# Patient Record
Sex: FEMALE | Race: OTHER | HISPANIC OR LATINO
[De-identification: names, ages, dates, MRNs, and addresses within clinical notes are randomized per-mention and may not be internally consistent; named-entity substitution may affect disease eponyms.]

---

## 2019-05-09 ENCOUNTER — RESULT REVIEW (OUTPATIENT)
Age: 74
End: 2019-05-09

## 2020-02-14 ENCOUNTER — APPOINTMENT (OUTPATIENT)
Dept: NEUROLOGY | Facility: CLINIC | Age: 75
End: 2020-02-14
Payer: MEDICAID

## 2020-02-14 VITALS
OXYGEN SATURATION: 97 % | DIASTOLIC BLOOD PRESSURE: 76 MMHG | HEIGHT: 60 IN | HEART RATE: 81 BPM | TEMPERATURE: 98.2 F | SYSTOLIC BLOOD PRESSURE: 129 MMHG | BODY MASS INDEX: 34.95 KG/M2 | WEIGHT: 178 LBS

## 2020-02-14 DIAGNOSIS — R73.03 PREDIABETES.: ICD-10-CM

## 2020-02-14 DIAGNOSIS — Z85.3 PERSONAL HISTORY OF MALIGNANT NEOPLASM OF BREAST: ICD-10-CM

## 2020-02-14 DIAGNOSIS — Z80.42 FAMILY HISTORY OF MALIGNANT NEOPLASM OF PROSTATE: ICD-10-CM

## 2020-02-14 DIAGNOSIS — Z83.3 FAMILY HISTORY OF DIABETES MELLITUS: ICD-10-CM

## 2020-02-14 DIAGNOSIS — Z80.0 FAMILY HISTORY OF MALIGNANT NEOPLASM OF DIGESTIVE ORGANS: ICD-10-CM

## 2020-02-14 DIAGNOSIS — Z80.1 FAMILY HISTORY OF MALIGNANT NEOPLASM OF TRACHEA, BRONCHUS AND LUNG: ICD-10-CM

## 2020-02-14 DIAGNOSIS — E05.90 THYROTOXICOSIS, UNSPECIFIED W/OUT THYROTOXIC CRISIS OR STORM: ICD-10-CM

## 2020-02-14 PROCEDURE — 99205 OFFICE O/P NEW HI 60 MIN: CPT

## 2020-02-14 RX ORDER — METHIMAZOLE 5 MG/1
5 TABLET ORAL
Refills: 0 | Status: ACTIVE | COMMUNITY
Start: 2020-02-14

## 2020-02-14 NOTE — ASSESSMENT
[FreeTextEntry1] : The patient has balance problems and on exam she is noted to have signs of large fiber neuropathy.  She has risk factors of prior chemotherapy, pre-DM and thyroid problems.  Will check labs for basic causes of neuropathy, as well and [email or web address]. \par \par Vertigo, unclear etiology.  WIll get MRI brain to r/o cva and will refer the patient to ENT.\par \par Will discuss treatment after the results of the tests above.

## 2020-02-14 NOTE — CONSULT LETTER
[Dear  ___] : Dear  [unfilled], [Consult Letter:] : I had the pleasure of evaluating your patient, [unfilled]. [Please see my note below.] : Please see my note below. [Consult Closing:] : Thank you very much for allowing me to participate in the care of this patient.  If you have any questions, please do not hesitate to contact me. [Sincerely,] : Sincerely, [FreeTextEntry3] : Liv Larios MD, MPH\par

## 2020-02-14 NOTE — PHYSICAL EXAM
[General Appearance - Alert] : alert [General Appearance - In No Acute Distress] : in no acute distress [Person] : oriented to person [Place] : oriented to place [Time] : oriented to time [Registration Intact] : recent registration memory intact [Concentration Intact] : normal concentrating ability [Visual Intact] : visual attention was ~T not ~L decreased [Naming Objects] : no difficulty naming common objects [Repeating Phrases] : no difficulty repeating a phrase [Fluency] : fluency intact [Comprehension] : comprehension intact [Vocabulary] : adequate range of vocabulary [Cranial Nerves Optic (II)] : visual acuity intact bilaterally,  visual fields full to confrontation, pupils equal round and reactive to light [Cranial Nerves Oculomotor (III)] : extraocular motion intact [Cranial Nerves Trigeminal (V)] : facial sensation intact symmetrically [Cranial Nerves Facial (VII)] : face symmetrical [Cranial Nerves Vestibulocochlear (VIII)] : hearing was intact bilaterally [Cranial Nerves Glossopharyngeal (IX)] : tongue and palate midline [Cranial Nerves Accessory (XI - Cranial And Spinal)] : head turning and shoulder shrug symmetric [Cranial Nerves Hypoglossal (XII)] : there was no tongue deviation with protrusion [Motor Tone] : muscle tone was normal in all four extremities [Motor Strength] : muscle strength was normal in all four extremities [Involuntary Movements] : no involuntary movements were seen [Sensation Tactile Decrease] : light touch was intact [Sensation Pain / Temperature Decrease] : pain and temperature was intact [Romberg's Sign] : a positive Romberg's sign was present [Abnormal Walk] : normal gait [Limited Balance] : the patient's balance was impaired [1+] : Ankle jerk left 1+ [Full Pulse] : the pedal pulses are present [Coordination - Dysmetria Impaired Finger-to-Nose Bilateral] : not present [Coordination - Dysmetria Impaired Heel-to-Shin Bilateral] : not present [Plantar Reflex Right Only] : normal on the right [Plantar Reflex Left Only] : normal on the left [FreeTextEntry5] : fundi not visualized [FreeTextEntry7] : vibration and proprioception decreased in the toes

## 2020-02-19 LAB
ALBUMIN MFR SERPL ELPH: 56 %
ALBUMIN SERPL-MCNC: 4.1 G/DL
ALBUMIN/GLOB SERPL: 1.2 RATIO
ALPHA1 GLOB MFR SERPL ELPH: 4.3 %
ALPHA1 GLOB SERPL ELPH-MCNC: 0.3 G/DL
ALPHA2 GLOB MFR SERPL ELPH: 10 %
ALPHA2 GLOB SERPL ELPH-MCNC: 0.7 G/DL
B-GLOBULIN MFR SERPL ELPH: 12.5 %
B-GLOBULIN SERPL ELPH-MCNC: 0.9 G/DL
ESTIMATED AVERAGE GLUCOSE: 143 MG/DL
FOLATE SERPL-MCNC: 16.5 NG/ML
GAMMA GLOB FLD ELPH-MCNC: 1.3 G/DL
GAMMA GLOB MFR SERPL ELPH: 17.2 %
HBA1C MFR BLD HPLC: 6.6 %
INTERPRETATION SERPL IEP-IMP: NORMAL
M PROTEIN SPEC IFE-MCNC: NORMAL
METHYLMALONATE SERPL-SCNC: 189 NMOL/L
PROT SERPL-MCNC: 7.4 G/DL
PROT SERPL-MCNC: 7.4 G/DL
T3 SERPL-MCNC: 127 NG/DL
T4 SERPL-MCNC: 6.3 UG/DL
TSH SERPL-ACNC: 0.69 UIU/ML
VIT B12 SERPL-MCNC: 775 PG/ML

## 2020-03-06 ENCOUNTER — FORM ENCOUNTER (OUTPATIENT)
Age: 75
End: 2020-03-06

## 2020-03-07 ENCOUNTER — OUTPATIENT (OUTPATIENT)
Dept: OUTPATIENT SERVICES | Facility: HOSPITAL | Age: 75
LOS: 1 days | End: 2020-03-07
Payer: COMMERCIAL

## 2020-03-07 ENCOUNTER — APPOINTMENT (OUTPATIENT)
Dept: MRI IMAGING | Facility: HOSPITAL | Age: 75
End: 2020-03-07
Payer: MEDICARE

## 2020-03-07 DIAGNOSIS — R42 DIZZINESS AND GIDDINESS: ICD-10-CM

## 2020-03-07 PROCEDURE — 70551 MRI BRAIN STEM W/O DYE: CPT | Mod: 26

## 2020-03-07 PROCEDURE — 70551 MRI BRAIN STEM W/O DYE: CPT

## 2020-05-12 ENCOUNTER — APPOINTMENT (OUTPATIENT)
Dept: NEUROLOGY | Facility: CLINIC | Age: 75
End: 2020-05-12
Payer: MEDICARE

## 2020-05-12 DIAGNOSIS — R26.89 OTHER ABNORMALITIES OF GAIT AND MOBILITY: ICD-10-CM

## 2020-05-12 PROCEDURE — 99448 NTRPROF PH1/NTRNET/EHR 21-30: CPT

## 2020-05-12 NOTE — HISTORY OF PRESENT ILLNESS
[Patient] : the patient [Home] : at home, [unfilled] , at the time of the visit. [Other Location: e.g. Home (Enter Location, City,State)___] : at [unfilled] [FreeTextEntry4] : [place] [FreeTextEntry1] : The patient continues to have balance problems, she denies numbness or tingling.  NO weakness or pain in the legs. Has not had any falls.  Does exercises at home, previously in gym, has not had balance training.  Vertigo resolved.

## 2020-05-12 NOTE — ASSESSMENT
[FreeTextEntry1] : Balance problems cw peripheral neuropathy, large fiber, may be due to prior chemotherapy, pre-DM and thyroid d/o (later now well controlled), pending emg  legs, no pain thus medications not useful but will refer to PT for balance training\par \par Vertigo, resolved

## 2020-06-16 ENCOUNTER — APPOINTMENT (OUTPATIENT)
Dept: OTOLARYNGOLOGY | Facility: CLINIC | Age: 75
End: 2020-06-16

## 2020-07-07 ENCOUNTER — APPOINTMENT (OUTPATIENT)
Dept: NEUROLOGY | Facility: CLINIC | Age: 75
End: 2020-07-07
Payer: MEDICARE

## 2020-07-07 VITALS
HEIGHT: 60 IN | HEART RATE: 83 BPM | OXYGEN SATURATION: 98 % | WEIGHT: 178 LBS | TEMPERATURE: 97.9 F | DIASTOLIC BLOOD PRESSURE: 73 MMHG | BODY MASS INDEX: 34.95 KG/M2 | SYSTOLIC BLOOD PRESSURE: 131 MMHG

## 2020-07-07 PROCEDURE — 95913 NRV CNDJ TEST 13/> STUDIES: CPT

## 2020-07-07 PROCEDURE — 95886 MUSC TEST DONE W/N TEST COMP: CPT

## 2021-04-05 ENCOUNTER — APPOINTMENT (OUTPATIENT)
Dept: COLORECTAL SURGERY | Facility: CLINIC | Age: 76
End: 2021-04-05
Payer: MEDICARE

## 2021-04-05 VITALS
SYSTOLIC BLOOD PRESSURE: 140 MMHG | WEIGHT: 180 LBS | BODY MASS INDEX: 35.34 KG/M2 | HEIGHT: 60 IN | TEMPERATURE: 98.4 F | HEART RATE: 89 BPM | DIASTOLIC BLOOD PRESSURE: 73 MMHG

## 2021-04-05 DIAGNOSIS — Z92.89 PERSONAL HISTORY OF OTHER MEDICAL TREATMENT: ICD-10-CM

## 2021-04-05 DIAGNOSIS — K63.5 POLYP OF COLON: ICD-10-CM

## 2021-04-05 DIAGNOSIS — M19.90 UNSPECIFIED OSTEOARTHRITIS, UNSPECIFIED SITE: ICD-10-CM

## 2021-04-05 DIAGNOSIS — Z82.49 FAMILY HISTORY OF ISCHEMIC HEART DISEASE AND OTHER DISEASES OF THE CIRCULATORY SYSTEM: ICD-10-CM

## 2021-04-05 DIAGNOSIS — Z72.3 LACK OF PHYSICAL EXERCISE: ICD-10-CM

## 2021-04-05 DIAGNOSIS — Z23 ENCOUNTER FOR IMMUNIZATION: ICD-10-CM

## 2021-04-05 DIAGNOSIS — E78.00 PURE HYPERCHOLESTEROLEMIA, UNSPECIFIED: ICD-10-CM

## 2021-04-05 PROCEDURE — 99205 OFFICE O/P NEW HI 60 MIN: CPT

## 2021-04-05 PROCEDURE — 99072 ADDL SUPL MATRL&STAF TM PHE: CPT

## 2021-04-05 RX ORDER — ATORVASTATIN CALCIUM 10 MG/1
10 TABLET, FILM COATED ORAL
Refills: 0 | Status: ACTIVE | COMMUNITY

## 2021-04-05 RX ORDER — METRONIDAZOLE 500 MG/1
500 TABLET ORAL
Qty: 6 | Refills: 0 | Status: ACTIVE | COMMUNITY
Start: 2021-04-05 | End: 1900-01-01

## 2021-04-05 RX ORDER — NEOMYCIN SULFATE 500 MG/1
500 TABLET ORAL
Qty: 6 | Refills: 0 | Status: ACTIVE | COMMUNITY
Start: 2021-04-05 | End: 1900-01-01

## 2021-04-05 NOTE — ASSESSMENT
[FreeTextEntry1] : I reviewed the patient treatment options at this time including right hemicolectomy as well as the need for future colonoscopy for clearance of the remaining colon polyps. I have discussed the risk of recurrent polyps and a future surgery. She has episodes of frequent fecal incontinence and therefore we will attempt to preserve as much colon as possible with limited resection and close coloscopic surveillance. However, I discussed the need for possible increasing antidiarrheal agents and worsening incontinence despite limited resection in this circumstance given her already compromised nature function.\par \par I had extensive discussion with the patient (60 minutes) regarding the diagnosis and treatment options. I recommended that he consider proceeding with a robotic possible open right hemicolectomy.\par \par The associated risks, benefits, alternatives of the procedure have been outlined discussed and reviewed with the patient's family. These risks including but not limited to bleeding, infection, anastomotic leak, need for secondary surgery, need for ileostomy or colostomy creation, change in bowel habits, cdvt,pe as well as the risk of heart and lung complications infection and death were detailed. The patient understands these risks and consents the planned procedure. Appropriate  literature regarding surgery and post operative treatment/complications and enhanced recovery pathway has been detailed and reviewed. Consent was obtained. All questions were answered.\par \par

## 2021-04-05 NOTE — PHYSICAL EXAM
[Abdomen Masses] : No abdominal masses [Abdomen Tenderness] : ~T No ~M abdominal tenderness [No HSM] : no hepatosplenomegaly [JVD] : no jugular venous distention  [Normal Breath Sounds] : Normal breath sounds [de-identified] : Well-healed infraumbilical midline incision with skin retraction and secondary laparoscopic upper abdominal incisions.

## 2021-04-05 NOTE — HISTORY OF PRESENT ILLNESS
[FreeTextEntry1] : 74 y/o English and Iraqi speaking F presents for evaluation of colon polyp, referred by Dr. Timbo Bain\par \par Patient underwent a routine screening colonoscopy 12/2021 with Dr. Bain. A number of polyps noted and recommended to repeat colonoscopy in several months\par \par Colonoscopy completed 3/23/21\par - 1 cm flat polyp in the descending colon, not biopsied or removed\par - multiple flat polyps, largest 2 cm in the transverse colon, not biopsied or removed\par - flat mass of 2.5 cm, attempted to raise this lesion with Eleview, however, it did not lift. Multiple biopsies taken\par - 1.5 cm flat polyp noted in the cecum, separate from the 2.5 cm mass, not biopsied or removed\par \par Previous colonoscopies completed prior to most recent were more than 10 years ago, patient uncertain of results\par Denies abdominal or rectal pain, rectal bleeding\par Denies N/V, change in appetite or weight\par BH: 2-4 times daily for the past several years\par Stool's consistency is apple sauce per patient\par C/o FU and occasional full FU. Reports ~ 5 episodes in the past several years, with several episodes in March 2021\par Reports adequate dietary fiber intake. Admits to limited water intake\par No use of fiber supplements \par FMH of colon cancer in brother, diagnosed ~ age 61\par No ASA/NSAIDs last 7 days\par

## 2021-04-16 ENCOUNTER — OUTPATIENT (OUTPATIENT)
Dept: OUTPATIENT SERVICES | Facility: HOSPITAL | Age: 76
LOS: 1 days | End: 2021-04-16

## 2021-04-16 ENCOUNTER — RESULT REVIEW (OUTPATIENT)
Age: 76
End: 2021-04-16

## 2021-04-16 ENCOUNTER — APPOINTMENT (OUTPATIENT)
Dept: CT IMAGING | Facility: CLINIC | Age: 76
End: 2021-04-16
Payer: MEDICARE

## 2021-04-16 PROCEDURE — 74177 CT ABD & PELVIS W/CONTRAST: CPT | Mod: 26

## 2021-04-23 ENCOUNTER — NON-APPOINTMENT (OUTPATIENT)
Age: 76
End: 2021-04-23

## 2021-04-30 DIAGNOSIS — Z01.818 ENCOUNTER FOR OTHER PREPROCEDURAL EXAMINATION: ICD-10-CM

## 2021-05-03 ENCOUNTER — APPOINTMENT (OUTPATIENT)
Dept: DISASTER EMERGENCY | Facility: CLINIC | Age: 76
End: 2021-05-03

## 2021-05-04 LAB — SARS-COV-2 N GENE NPH QL NAA+PROBE: NOT DETECTED

## 2021-05-05 ENCOUNTER — TRANSCRIPTION ENCOUNTER (OUTPATIENT)
Age: 76
End: 2021-05-05

## 2021-05-05 VITALS
DIASTOLIC BLOOD PRESSURE: 85 MMHG | TEMPERATURE: 98 F | SYSTOLIC BLOOD PRESSURE: 140 MMHG | HEART RATE: 96 BPM | RESPIRATION RATE: 18 BRPM | HEIGHT: 60 IN | WEIGHT: 177.47 LBS | OXYGEN SATURATION: 100 %

## 2021-05-06 ENCOUNTER — INPATIENT (INPATIENT)
Facility: HOSPITAL | Age: 76
LOS: 2 days | Discharge: HOME | DRG: 331 | End: 2021-05-09
Attending: SURGERY | Admitting: SURGERY
Payer: MEDICARE

## 2021-05-06 ENCOUNTER — APPOINTMENT (OUTPATIENT)
Dept: COLORECTAL SURGERY | Facility: HOSPITAL | Age: 76
End: 2021-05-06

## 2021-05-06 ENCOUNTER — RESULT REVIEW (OUTPATIENT)
Age: 76
End: 2021-05-06

## 2021-05-06 DIAGNOSIS — Z87.59 PERSONAL HISTORY OF OTHER COMPLICATIONS OF PREGNANCY, CHILDBIRTH AND THE PUERPERIUM: Chronic | ICD-10-CM

## 2021-05-06 DIAGNOSIS — Z90.49 ACQUIRED ABSENCE OF OTHER SPECIFIED PARTS OF DIGESTIVE TRACT: Chronic | ICD-10-CM

## 2021-05-06 DIAGNOSIS — Z98.51 TUBAL LIGATION STATUS: Chronic | ICD-10-CM

## 2021-05-06 DIAGNOSIS — Z98.890 OTHER SPECIFIED POSTPROCEDURAL STATES: Chronic | ICD-10-CM

## 2021-05-06 LAB
ANION GAP SERPL CALC-SCNC: 13 MMOL/L — SIGNIFICANT CHANGE UP (ref 5–17)
BLD GP AB SCN SERPL QL: NEGATIVE — SIGNIFICANT CHANGE UP
BUN SERPL-MCNC: 19 MG/DL — SIGNIFICANT CHANGE UP (ref 7–23)
CALCIUM SERPL-MCNC: 8.9 MG/DL — SIGNIFICANT CHANGE UP (ref 8.4–10.5)
CHLORIDE SERPL-SCNC: 105 MMOL/L — SIGNIFICANT CHANGE UP (ref 96–108)
CO2 SERPL-SCNC: 21 MMOL/L — LOW (ref 22–31)
CREAT SERPL-MCNC: 0.93 MG/DL — SIGNIFICANT CHANGE UP (ref 0.5–1.3)
GLUCOSE SERPL-MCNC: 161 MG/DL — HIGH (ref 70–99)
HCT VFR BLD CALC: 42.8 % — SIGNIFICANT CHANGE UP (ref 34.5–45)
HGB BLD-MCNC: 13.7 G/DL — SIGNIFICANT CHANGE UP (ref 11.5–15.5)
MAGNESIUM SERPL-MCNC: 2.1 MG/DL — SIGNIFICANT CHANGE UP (ref 1.6–2.6)
MCHC RBC-ENTMCNC: 29.8 PG — SIGNIFICANT CHANGE UP (ref 27–34)
MCHC RBC-ENTMCNC: 32 GM/DL — SIGNIFICANT CHANGE UP (ref 32–36)
MCV RBC AUTO: 93 FL — SIGNIFICANT CHANGE UP (ref 80–100)
NRBC # BLD: 0 /100 WBCS — SIGNIFICANT CHANGE UP (ref 0–0)
PHOSPHATE SERPL-MCNC: 3.1 MG/DL — SIGNIFICANT CHANGE UP (ref 2.5–4.5)
PLATELET # BLD AUTO: 195 K/UL — SIGNIFICANT CHANGE UP (ref 150–400)
POTASSIUM SERPL-MCNC: 3.7 MMOL/L — SIGNIFICANT CHANGE UP (ref 3.5–5.3)
POTASSIUM SERPL-SCNC: 3.7 MMOL/L — SIGNIFICANT CHANGE UP (ref 3.5–5.3)
RBC # BLD: 4.6 M/UL — SIGNIFICANT CHANGE UP (ref 3.8–5.2)
RBC # FLD: 14.6 % — HIGH (ref 10.3–14.5)
RH IG SCN BLD-IMP: POSITIVE — SIGNIFICANT CHANGE UP
SODIUM SERPL-SCNC: 139 MMOL/L — SIGNIFICANT CHANGE UP (ref 135–145)
WBC # BLD: 10.93 K/UL — HIGH (ref 3.8–10.5)
WBC # FLD AUTO: 10.93 K/UL — HIGH (ref 3.8–10.5)

## 2021-05-06 PROCEDURE — 88309 TISSUE EXAM BY PATHOLOGIST: CPT | Mod: 26

## 2021-05-06 PROCEDURE — 44205 LAP COLECTOMY PART W/ILEUM: CPT | Mod: GC

## 2021-05-06 RX ORDER — SODIUM CHLORIDE 9 MG/ML
1000 INJECTION, SOLUTION INTRAVENOUS
Refills: 0 | Status: DISCONTINUED | OUTPATIENT
Start: 2021-05-06 | End: 2021-05-09

## 2021-05-06 RX ORDER — DEXTROSE 50 % IN WATER 50 %
25 SYRINGE (ML) INTRAVENOUS ONCE
Refills: 0 | Status: DISCONTINUED | OUTPATIENT
Start: 2021-05-06 | End: 2021-05-09

## 2021-05-06 RX ORDER — DEXTROSE 50 % IN WATER 50 %
12.5 SYRINGE (ML) INTRAVENOUS ONCE
Refills: 0 | Status: DISCONTINUED | OUTPATIENT
Start: 2021-05-06 | End: 2021-05-09

## 2021-05-06 RX ORDER — DEXTROSE 50 % IN WATER 50 %
15 SYRINGE (ML) INTRAVENOUS ONCE
Refills: 0 | Status: DISCONTINUED | OUTPATIENT
Start: 2021-05-06 | End: 2021-05-09

## 2021-05-06 RX ORDER — HYDROMORPHONE HYDROCHLORIDE 2 MG/ML
0.5 INJECTION INTRAMUSCULAR; INTRAVENOUS; SUBCUTANEOUS EVERY 4 HOURS
Refills: 0 | Status: DISCONTINUED | OUTPATIENT
Start: 2021-05-06 | End: 2021-05-09

## 2021-05-06 RX ORDER — ACETAMINOPHEN 500 MG
1000 TABLET ORAL ONCE
Refills: 0 | Status: COMPLETED | OUTPATIENT
Start: 2021-05-06 | End: 2021-05-06

## 2021-05-06 RX ORDER — HEPARIN SODIUM 5000 [USP'U]/ML
5000 INJECTION INTRAVENOUS; SUBCUTANEOUS EVERY 8 HOURS
Refills: 0 | Status: DISCONTINUED | OUTPATIENT
Start: 2021-05-06 | End: 2021-05-09

## 2021-05-06 RX ORDER — GABAPENTIN 400 MG/1
600 CAPSULE ORAL ONCE
Refills: 0 | Status: COMPLETED | OUTPATIENT
Start: 2021-05-06 | End: 2021-05-06

## 2021-05-06 RX ORDER — BUPIVACAINE 13.3 MG/ML
20 INJECTION, SUSPENSION, LIPOSOMAL INFILTRATION ONCE
Refills: 0 | Status: DISCONTINUED | OUTPATIENT
Start: 2021-05-06 | End: 2021-05-06

## 2021-05-06 RX ORDER — METHIMAZOLE 10 MG/1
1 TABLET ORAL
Qty: 0 | Refills: 0 | DISCHARGE

## 2021-05-06 RX ORDER — ACETAMINOPHEN 500 MG
1000 TABLET ORAL EVERY 6 HOURS
Refills: 0 | Status: DISCONTINUED | OUTPATIENT
Start: 2021-05-06 | End: 2021-05-09

## 2021-05-06 RX ORDER — INSULIN LISPRO 100/ML
VIAL (ML) SUBCUTANEOUS
Refills: 0 | Status: DISCONTINUED | OUTPATIENT
Start: 2021-05-06 | End: 2021-05-09

## 2021-05-06 RX ORDER — ATORVASTATIN CALCIUM 80 MG/1
1 TABLET, FILM COATED ORAL
Qty: 0 | Refills: 0 | DISCHARGE

## 2021-05-06 RX ORDER — GLUCAGON INJECTION, SOLUTION 0.5 MG/.1ML
1 INJECTION, SOLUTION SUBCUTANEOUS ONCE
Refills: 0 | Status: DISCONTINUED | OUTPATIENT
Start: 2021-05-06 | End: 2021-05-09

## 2021-05-06 RX ORDER — KETOROLAC TROMETHAMINE 30 MG/ML
15 SYRINGE (ML) INJECTION EVERY 6 HOURS
Refills: 0 | Status: DISCONTINUED | OUTPATIENT
Start: 2021-05-06 | End: 2021-05-09

## 2021-05-06 RX ORDER — HEPARIN SODIUM 5000 [USP'U]/ML
5000 INJECTION INTRAVENOUS; SUBCUTANEOUS ONCE
Refills: 0 | Status: COMPLETED | OUTPATIENT
Start: 2021-05-06 | End: 2021-05-06

## 2021-05-06 RX ORDER — ATORVASTATIN CALCIUM 80 MG/1
10 TABLET, FILM COATED ORAL AT BEDTIME
Refills: 0 | Status: DISCONTINUED | OUTPATIENT
Start: 2021-05-06 | End: 2021-05-09

## 2021-05-06 RX ORDER — SODIUM CHLORIDE 9 MG/ML
1000 INJECTION, SOLUTION INTRAVENOUS
Refills: 0 | Status: DISCONTINUED | OUTPATIENT
Start: 2021-05-06 | End: 2021-05-08

## 2021-05-06 RX ORDER — ONDANSETRON 8 MG/1
4 TABLET, FILM COATED ORAL EVERY 6 HOURS
Refills: 0 | Status: DISCONTINUED | OUTPATIENT
Start: 2021-05-06 | End: 2021-05-09

## 2021-05-06 RX ADMIN — Medication 2: at 18:21

## 2021-05-06 RX ADMIN — HEPARIN SODIUM 5000 UNIT(S): 5000 INJECTION INTRAVENOUS; SUBCUTANEOUS at 11:42

## 2021-05-06 RX ADMIN — ATORVASTATIN CALCIUM 10 MILLIGRAM(S): 80 TABLET, FILM COATED ORAL at 21:15

## 2021-05-06 RX ADMIN — HEPARIN SODIUM 5000 UNIT(S): 5000 INJECTION INTRAVENOUS; SUBCUTANEOUS at 21:15

## 2021-05-06 RX ADMIN — Medication 1000 MILLIGRAM(S): at 18:44

## 2021-05-06 RX ADMIN — Medication 1000 MILLIGRAM(S): at 11:41

## 2021-05-06 RX ADMIN — Medication 1000 MILLIGRAM(S): at 18:35

## 2021-05-06 RX ADMIN — GABAPENTIN 600 MILLIGRAM(S): 400 CAPSULE ORAL at 11:41

## 2021-05-06 RX ADMIN — Medication 15 MILLIGRAM(S): at 18:36

## 2021-05-06 NOTE — H&P ADULT - HISTORY OF PRESENT ILLNESS
75 year old female with PMH of Graves diseazse, psoriasis, HLD, HTN, prediabetes, colon polyp presents for extended right hemicolectomy. Patient underwent screening colonoscopy on 12/20 which revealed several polyps and was referred for repeat in a couple of months. Repeat colonoscopy preformed on 3/23/21 which found multiple polyps in the transverse colon as well as a 2.5cm flat mass in the cecum. Patient has no complaints at this time.

## 2021-05-06 NOTE — H&P ADULT - NSICDXPASTSURGICALHX_GEN_ALL_CORE_FT
PAST SURGICAL HISTORY:  S/P cholecystectomy     S/P ectopic pregnancy surgery    S/P lumpectomy of breast right    S/P tubal ligation

## 2021-05-06 NOTE — PACU DISCHARGE NOTE - COMMENTS
Patient is hemodynamically stable and reports no c/o discomfort.  Meets PACU discharge criteria.  Report given to unit RN.  Patient transported via bed to unit. Accompanied by transport CNAx2

## 2021-05-06 NOTE — PROGRESS NOTE ADULT - SUBJECTIVE AND OBJECTIVE BOX
POST-OPERATIVE NOTE    Procedure: Robot assisted Right Hemicolectomy     Diagnosis/Indication: Mass in Cecum     Surgeon: Brenden Harmon     Subjective: Patient is not in acute distress, resting comfortably in bed, alert and oriented x 3, pain controlled. Patient denies fever, chills, nausea, vomiting, chest pain, calf pain or shortness of breathe      Objective:  T(C): --  T(F): --  HR: --  BP: --  RR: --  SpO2: --  Wt(kg): --                        13.7   10.93 )-----------( 195      ( 06 May 2021 17:54 )             42.8     05-06    139  |  105  |  19  ----------------------------<  161<H>  3.7   |  21<L>  |  0.93    Ca    8.9      06 May 2021 17:54  Phos  3.1     05-06  Mg     2.1     05-06        Gen: NAD, resting comfortably in bed  C/V: Regular rate  Pulm: Nonlabored breathing, no respiratory distress  Abd: Soft, NT, ND, incisions clean, dry without drainage and intact  Extrem: WWP, no calf edema or tenderness, SCDs in place      A/P: 75y Female s/p robot assisted right hemicolectomy   Pain/Nausea control   Diet: CLD  IVF: LR @ 40cc/hr  DVT ppx: SCDs in place   Encourage OOBA/IS  AM Labs

## 2021-05-06 NOTE — H&P ADULT - ASSESSMENT
75 year old female with PMH of Graves diseazse, psoriasis, HLD, HTN, prediabetes, colon polyp presents for extended right hemicolectomy.     Plan:  To OR  ERAS protocol  Colon bundle  admission post op

## 2021-05-06 NOTE — BRIEF OPERATIVE NOTE - NSICDXBRIEFPROCEDURE_GEN_ALL_CORE_FT
PROCEDURES:  Colectomy, right, robot-assisted, using da Myra Xi 06-May-2021 16:44:53  Maryellen Fuentes

## 2021-05-06 NOTE — H&P ADULT - NSICDXPASTMEDICALHX_GEN_ALL_CORE_FT
PAST MEDICAL HISTORY:  Breast cancer right    Colon polyp     Graves disease     HLD (hyperlipidemia)     HTN (hypertension)     Hyperthyroidism     Morbid obesity     Prediabetes     Psoriasis     Rosacea     Varicose veins

## 2021-05-06 NOTE — BRIEF OPERATIVE NOTE - OPERATION/FINDINGS
Robotic right colectomy with intracorporeal isoperistaltic side to side stapled anastomosis. Enterotomy closed with v-wilbur suture in two layers.

## 2021-05-07 LAB
A1C WITH ESTIMATED AVERAGE GLUCOSE RESULT: 6.3 % — HIGH (ref 4–5.6)
ANION GAP SERPL CALC-SCNC: 11 MMOL/L — SIGNIFICANT CHANGE UP (ref 5–17)
BUN SERPL-MCNC: 14 MG/DL — SIGNIFICANT CHANGE UP (ref 7–23)
CALCIUM SERPL-MCNC: 8.6 MG/DL — SIGNIFICANT CHANGE UP (ref 8.4–10.5)
CHLORIDE SERPL-SCNC: 106 MMOL/L — SIGNIFICANT CHANGE UP (ref 96–108)
CO2 SERPL-SCNC: 21 MMOL/L — LOW (ref 22–31)
COVID-19 SPIKE DOMAIN AB INTERP: POSITIVE
COVID-19 SPIKE DOMAIN ANTIBODY RESULT: >250 U/ML — HIGH
CREAT SERPL-MCNC: 0.71 MG/DL — SIGNIFICANT CHANGE UP (ref 0.5–1.3)
ESTIMATED AVERAGE GLUCOSE: 134 MG/DL — HIGH (ref 68–114)
GLUCOSE SERPL-MCNC: 115 MG/DL — HIGH (ref 70–99)
HCT VFR BLD CALC: 36.3 % — SIGNIFICANT CHANGE UP (ref 34.5–45)
HCV AB S/CO SERPL IA: 0.04 S/CO — SIGNIFICANT CHANGE UP
HCV AB SERPL-IMP: SIGNIFICANT CHANGE UP
HGB BLD-MCNC: 11.5 G/DL — SIGNIFICANT CHANGE UP (ref 11.5–15.5)
MAGNESIUM SERPL-MCNC: 1.9 MG/DL — SIGNIFICANT CHANGE UP (ref 1.6–2.6)
MCHC RBC-ENTMCNC: 29.6 PG — SIGNIFICANT CHANGE UP (ref 27–34)
MCHC RBC-ENTMCNC: 31.7 GM/DL — LOW (ref 32–36)
MCV RBC AUTO: 93.6 FL — SIGNIFICANT CHANGE UP (ref 80–100)
NRBC # BLD: 0 /100 WBCS — SIGNIFICANT CHANGE UP (ref 0–0)
PHOSPHATE SERPL-MCNC: 3.2 MG/DL — SIGNIFICANT CHANGE UP (ref 2.5–4.5)
PLATELET # BLD AUTO: 163 K/UL — SIGNIFICANT CHANGE UP (ref 150–400)
POTASSIUM SERPL-MCNC: 4.6 MMOL/L — SIGNIFICANT CHANGE UP (ref 3.5–5.3)
POTASSIUM SERPL-SCNC: 4.6 MMOL/L — SIGNIFICANT CHANGE UP (ref 3.5–5.3)
RBC # BLD: 3.88 M/UL — SIGNIFICANT CHANGE UP (ref 3.8–5.2)
RBC # FLD: 14.7 % — HIGH (ref 10.3–14.5)
SARS-COV-2 IGG+IGM SERPL QL IA: >250 U/ML — HIGH
SARS-COV-2 IGG+IGM SERPL QL IA: POSITIVE
SODIUM SERPL-SCNC: 138 MMOL/L — SIGNIFICANT CHANGE UP (ref 135–145)
WBC # BLD: 9.32 K/UL — SIGNIFICANT CHANGE UP (ref 3.8–10.5)
WBC # FLD AUTO: 9.32 K/UL — SIGNIFICANT CHANGE UP (ref 3.8–10.5)

## 2021-05-07 RX ORDER — POTASSIUM CHLORIDE 20 MEQ
40 PACKET (EA) ORAL ONCE
Refills: 0 | Status: COMPLETED | OUTPATIENT
Start: 2021-05-07 | End: 2021-05-07

## 2021-05-07 RX ADMIN — Medication 15 MILLIGRAM(S): at 12:11

## 2021-05-07 RX ADMIN — Medication 1000 MILLIGRAM(S): at 13:11

## 2021-05-07 RX ADMIN — Medication 1000 MILLIGRAM(S): at 05:33

## 2021-05-07 RX ADMIN — HEPARIN SODIUM 5000 UNIT(S): 5000 INJECTION INTRAVENOUS; SUBCUTANEOUS at 21:14

## 2021-05-07 RX ADMIN — Medication 1000 MILLIGRAM(S): at 00:05

## 2021-05-07 RX ADMIN — Medication 1000 MILLIGRAM(S): at 00:17

## 2021-05-07 RX ADMIN — Medication 15 MILLIGRAM(S): at 05:59

## 2021-05-07 RX ADMIN — HEPARIN SODIUM 5000 UNIT(S): 5000 INJECTION INTRAVENOUS; SUBCUTANEOUS at 15:06

## 2021-05-07 RX ADMIN — Medication 15 MILLIGRAM(S): at 17:46

## 2021-05-07 RX ADMIN — Medication 15 MILLIGRAM(S): at 12:26

## 2021-05-07 RX ADMIN — ATORVASTATIN CALCIUM 10 MILLIGRAM(S): 80 TABLET, FILM COATED ORAL at 21:14

## 2021-05-07 RX ADMIN — Medication 1000 MILLIGRAM(S): at 17:46

## 2021-05-07 RX ADMIN — Medication 15 MILLIGRAM(S): at 18:01

## 2021-05-07 RX ADMIN — Medication 40 MILLIEQUIVALENT(S): at 05:32

## 2021-05-07 RX ADMIN — Medication 1000 MILLIGRAM(S): at 18:46

## 2021-05-07 RX ADMIN — HEPARIN SODIUM 5000 UNIT(S): 5000 INJECTION INTRAVENOUS; SUBCUTANEOUS at 05:33

## 2021-05-07 RX ADMIN — Medication 1000 MILLIGRAM(S): at 12:11

## 2021-05-07 RX ADMIN — Medication 1000 MILLIGRAM(S): at 05:58

## 2021-05-07 RX ADMIN — Medication 15 MILLIGRAM(S): at 05:32

## 2021-05-07 RX ADMIN — Medication 15 MILLIGRAM(S): at 00:04

## 2021-05-07 RX ADMIN — Medication 15 MILLIGRAM(S): at 00:17

## 2021-05-07 NOTE — PROGRESS NOTE ADULT - SUBJECTIVE AND OBJECTIVE BOX
Pt did well overnight  Tolerated water  -F/-BM  Wants to walk today  Patient seen and examined bedside with chief resident        Vital Signs Last 24 Hrs  T(C): 36.8 (07 May 2021 13:19), Max: 37.1 (07 May 2021 09:08)  T(F): 98.3 (07 May 2021 13:19), Max: 98.7 (07 May 2021 09:08)  HR: 92 (07 May 2021 13:19) (70 - 95)  BP: 107/75 (07 May 2021 13:19) (107/75 - 160/72)  BP(mean): 97 (06 May 2021 20:37) (83 - 109)  RR: 17 (07 May 2021 13:19) (13 - 23)  SpO2: 95% (07 May 2021 13:19) (95% - 100%)    I&O's Detail    06 May 2021 07:01  -  07 May 2021 07:00  --------------------------------------------------------  IN:    Lactated Ringers: 500 mL    Oral Fluid: 1073 mL    Other (mL): 1800 mL  Total IN: 3373 mL    OUT:    Indwelling Catheter - Urethral (mL): 1860 mL  Total OUT: 1860 mL    Total NET: 1513 mL      07 May 2021 07:01  -  07 May 2021 15:30  --------------------------------------------------------  IN:    Oral Fluid: 880 mL  Total IN: 880 mL    OUT:    Voided (mL): 500 mL  Total OUT: 500 mL    Total NET: 380 mL      Appropriate UOP      Physical Exam  General: NAD, resting comfortably in bed  Pulm: Nonlabored breathing, no respiratory distress  Abd: soft, ND, mildly tender, incisions c/d/i  Extrem: WWP, no edema,  Neuro: A/O x 3, CNs II-XII grossly intact, no focal deficits, normal sensation  Pulses: palpable distal pulses                             11.5   9.32  )-----------( 163      ( 07 May 2021 07:01 )             36.3   05-07    138  |  106  |  14  ----------------------------<  115<H>  4.6   |  21<L>  |  0.71    Ca    8.6      07 May 2021 07:01  Phos  3.2     05-07  Mg     1.9     05-07    A1c elevated to 6.3%  H/H down from yesterday, monitoring  WBC wnl    No new imaging

## 2021-05-08 ENCOUNTER — TRANSCRIPTION ENCOUNTER (OUTPATIENT)
Age: 76
End: 2021-05-08

## 2021-05-08 LAB
ANION GAP SERPL CALC-SCNC: 12 MMOL/L — SIGNIFICANT CHANGE UP (ref 5–17)
BUN SERPL-MCNC: 17 MG/DL — SIGNIFICANT CHANGE UP (ref 7–23)
CALCIUM SERPL-MCNC: 8.6 MG/DL — SIGNIFICANT CHANGE UP (ref 8.4–10.5)
CHLORIDE SERPL-SCNC: 109 MMOL/L — HIGH (ref 96–108)
CO2 SERPL-SCNC: 20 MMOL/L — LOW (ref 22–31)
CREAT SERPL-MCNC: 0.7 MG/DL — SIGNIFICANT CHANGE UP (ref 0.5–1.3)
GLUCOSE SERPL-MCNC: 96 MG/DL — SIGNIFICANT CHANGE UP (ref 70–99)
HCT VFR BLD CALC: 35.8 % — SIGNIFICANT CHANGE UP (ref 34.5–45)
HGB BLD-MCNC: 11.2 G/DL — LOW (ref 11.5–15.5)
MAGNESIUM SERPL-MCNC: 2.2 MG/DL — SIGNIFICANT CHANGE UP (ref 1.6–2.6)
MCHC RBC-ENTMCNC: 29.5 PG — SIGNIFICANT CHANGE UP (ref 27–34)
MCHC RBC-ENTMCNC: 31.3 GM/DL — LOW (ref 32–36)
MCV RBC AUTO: 94.2 FL — SIGNIFICANT CHANGE UP (ref 80–100)
NRBC # BLD: 0 /100 WBCS — SIGNIFICANT CHANGE UP (ref 0–0)
PHOSPHATE SERPL-MCNC: 2 MG/DL — LOW (ref 2.5–4.5)
PLATELET # BLD AUTO: 160 K/UL — SIGNIFICANT CHANGE UP (ref 150–400)
POTASSIUM SERPL-MCNC: 4.1 MMOL/L — SIGNIFICANT CHANGE UP (ref 3.5–5.3)
POTASSIUM SERPL-SCNC: 4.1 MMOL/L — SIGNIFICANT CHANGE UP (ref 3.5–5.3)
RBC # BLD: 3.8 M/UL — SIGNIFICANT CHANGE UP (ref 3.8–5.2)
RBC # FLD: 15 % — HIGH (ref 10.3–14.5)
SODIUM SERPL-SCNC: 141 MMOL/L — SIGNIFICANT CHANGE UP (ref 135–145)
WBC # BLD: 8.04 K/UL — SIGNIFICANT CHANGE UP (ref 3.8–10.5)
WBC # FLD AUTO: 8.04 K/UL — SIGNIFICANT CHANGE UP (ref 3.8–10.5)

## 2021-05-08 PROCEDURE — 99231 SBSQ HOSP IP/OBS SF/LOW 25: CPT | Mod: 24

## 2021-05-08 RX ORDER — HYDROMORPHONE HYDROCHLORIDE 2 MG/ML
1 INJECTION INTRAMUSCULAR; INTRAVENOUS; SUBCUTANEOUS
Qty: 6 | Refills: 0
Start: 2021-05-08 | End: 2021-05-09

## 2021-05-08 RX ORDER — ACETAMINOPHEN 500 MG
2 TABLET ORAL
Qty: 40 | Refills: 0
Start: 2021-05-08 | End: 2021-05-12

## 2021-05-08 RX ORDER — OXYCODONE HYDROCHLORIDE 5 MG/1
1 TABLET ORAL
Qty: 6 | Refills: 0
Start: 2021-05-08 | End: 2021-05-09

## 2021-05-08 RX ORDER — DOCUSATE SODIUM 100 MG
1 CAPSULE ORAL
Qty: 5 | Refills: 0
Start: 2021-05-08 | End: 2021-05-12

## 2021-05-08 RX ORDER — POTASSIUM PHOSPHATE, MONOBASIC POTASSIUM PHOSPHATE, DIBASIC 236; 224 MG/ML; MG/ML
30 INJECTION, SOLUTION INTRAVENOUS ONCE
Refills: 0 | Status: COMPLETED | OUTPATIENT
Start: 2021-05-08 | End: 2021-05-08

## 2021-05-08 RX ADMIN — Medication 2: at 14:06

## 2021-05-08 RX ADMIN — POTASSIUM PHOSPHATE, MONOBASIC POTASSIUM PHOSPHATE, DIBASIC 83.33 MILLIMOLE(S): 236; 224 INJECTION, SOLUTION INTRAVENOUS at 16:30

## 2021-05-08 RX ADMIN — Medication 15 MILLIGRAM(S): at 05:15

## 2021-05-08 RX ADMIN — Medication 1000 MILLIGRAM(S): at 22:40

## 2021-05-08 RX ADMIN — ATORVASTATIN CALCIUM 10 MILLIGRAM(S): 80 TABLET, FILM COATED ORAL at 21:14

## 2021-05-08 RX ADMIN — HEPARIN SODIUM 5000 UNIT(S): 5000 INJECTION INTRAVENOUS; SUBCUTANEOUS at 14:06

## 2021-05-08 RX ADMIN — Medication 15 MILLIGRAM(S): at 05:07

## 2021-05-08 RX ADMIN — Medication 15 MILLIGRAM(S): at 23:00

## 2021-05-08 RX ADMIN — HEPARIN SODIUM 5000 UNIT(S): 5000 INJECTION INTRAVENOUS; SUBCUTANEOUS at 05:07

## 2021-05-08 RX ADMIN — Medication 1000 MILLIGRAM(S): at 21:14

## 2021-05-08 RX ADMIN — HEPARIN SODIUM 5000 UNIT(S): 5000 INJECTION INTRAVENOUS; SUBCUTANEOUS at 21:15

## 2021-05-08 NOTE — PROGRESS NOTE ADULT - SUBJECTIVE AND OBJECTIVE BOX
SUBJECTIVE: Patient seen and examined bedside by chief resident. Patient reports       heparin   Injectable 5000 Unit(s) SubCutaneous every 8 hours      Vital Signs Last 24 Hrs  T(C): 36.6 (08 May 2021 04:39), Max: 37.2 (07 May 2021 20:00)  T(F): 97.9 (08 May 2021 04:39), Max: 99 (07 May 2021 20:00)  HR: 82 (08 May 2021 04:39) (75 - 92)  BP: 151/76 (08 May 2021 04:39) (107/75 - 151/76)  BP(mean): --  RR: 18 (08 May 2021 04:39) (17 - 18)  SpO2: 96% (08 May 2021 04:39) (95% - 97%)  I&O's Detail    06 May 2021 07:01  -  07 May 2021 07:00  --------------------------------------------------------  IN:    Lactated Ringers: 500 mL    Oral Fluid: 1073 mL    Other (mL): 1800 mL  Total IN: 3373 mL    OUT:    Indwelling Catheter - Urethral (mL): 1860 mL  Total OUT: 1860 mL    Total NET: 1513 mL      07 May 2021 07:01  -  08 May 2021 05:43  --------------------------------------------------------  IN:    Lactated Ringers: 920 mL    Oral Fluid: 1757 mL  Total IN: 2677 mL    OUT:    Voided (mL): 1250 mL  Total OUT: 1250 mL    Total NET: 1427 mL          PHYSICAL EXAMINATION   General: NAD  NEURO:  follows commands.   PULM: nonlabored breathing, no respiratory distress  ABD: soft, nondistended, appropriately tender, no rebound, no guarding, no tympany. Incisions CDI and without hematoma or erythema    EXTREM: WWP, no edema, no calf tenderness  VASC: No cyanosis,  no pallor.   PSYCH: Appropriate affect, answers questions appropriately      LABS:                        11.5   9.32  )-----------( 163      ( 07 May 2021 07:01 )             36.3     05-07    138  |  106  |  14  ----------------------------<  115<H>  4.6   |  21<L>  |  0.71    Ca    8.6      07 May 2021 07:01  Phos  3.2     05-07  Mg     1.9     05-07            SUBJECTIVE: Patient seen and examined bedside by chief resident. Patient reports feeling good, no N/V with CLD, no fever or chills overnight, OOBA, BM and flatus +, some expected abdominal soreness reported.      heparin   Injectable 5000 Unit(s) SubCutaneous every 8 hours      Vital Signs Last 24 Hrs  T(C): 36.6 (08 May 2021 04:39), Max: 37.2 (07 May 2021 20:00)  T(F): 97.9 (08 May 2021 04:39), Max: 99 (07 May 2021 20:00)  HR: 82 (08 May 2021 04:39) (75 - 92)  BP: 151/76 (08 May 2021 04:39) (107/75 - 151/76)  BP(mean): --  RR: 18 (08 May 2021 04:39) (17 - 18)  SpO2: 96% (08 May 2021 04:39) (95% - 97%)  I&O's Detail    06 May 2021 07:01  -  07 May 2021 07:00  --------------------------------------------------------  IN:    Lactated Ringers: 500 mL    Oral Fluid: 1073 mL    Other (mL): 1800 mL  Total IN: 3373 mL    OUT:    Indwelling Catheter - Urethral (mL): 1860 mL  Total OUT: 1860 mL    Total NET: 1513 mL      07 May 2021 07:01  -  08 May 2021 05:43  --------------------------------------------------------  IN:    Lactated Ringers: 920 mL    Oral Fluid: 1757 mL  Total IN: 2677 mL    OUT:    Voided (mL): 1250 mL  Total OUT: 1250 mL    Total NET: 1427 mL          PHYSICAL EXAMINATION   General: NAD  NEURO:  follows commands.   PULM: nonlabored breathing, no respiratory distress  ABD: soft, nondistended, appropriately tender, no rebound, no guarding, no tympany. Incisions CDI and without hematoma or erythema    EXTREM: WWP, no edema, no calf tenderness  VASC: No cyanosis,  no pallor.   PSYCH: Appropriate affect, answers questions appropriately      LABS:                        11.5   9.32  )-----------( 163      ( 07 May 2021 07:01 )             36.3     05-07    138  |  106  |  14  ----------------------------<  115<H>  4.6   |  21<L>  |  0.71    Ca    8.6      07 May 2021 07:01  Phos  3.2     05-07  Mg     1.9     05-07

## 2021-05-08 NOTE — DISCHARGE NOTE PROVIDER - INSTRUCTIONS
Follow low fiber diet/low residue   - Vegetables should initially be cooked (backed, steamed, blanched)  - May want to start with peeled and/or canned foods   - Limit fat/oil intake and fried/greasy foods  - Add small amounts of fiber back in to the diet every couple days.  Stay Hydrated:  - Avoid sugary drinks and caffeine   - Drink plenty of water, Gatorade and other fluids low in sugar that has electrolytes. Add soup and broth to your diet.   - drink about 2000 ml or more a day, if you do not have other medical condition that requires fluid restriction.   - You should be voiding clear yellow urine more than 4 times a day.

## 2021-05-08 NOTE — DISCHARGE NOTE PROVIDER - CARE PROVIDER_API CALL
Brenden Burr)  ColonRectal Surgery; Surgery  [facility], [address] [place] [address]  Phone: [phone]  Fax: [phone]  Established Patient  Follow Up Time: 1 week

## 2021-05-08 NOTE — DISCHARGE NOTE PROVIDER - NSDCCPCAREPLAN_GEN_ALL_CORE_FT
PRINCIPAL DISCHARGE DIAGNOSIS  Diagnosis: Cecal lesion  Assessment and Plan of Treatment:       SECONDARY DISCHARGE DIAGNOSES  Diagnosis: Colon polyps  Assessment and Plan of Treatment:

## 2021-05-08 NOTE — PROGRESS NOTE ADULT - SUBJECTIVE AND OBJECTIVE BOX
POD 2 s/p right hemicolectomy for multiple benign polyps    On clear liquids  Ambulating  Wants to go home.  Has passed flatus and had loose BM  Voiding on own    Vital Signs Last 24 Hrs  T(C): 36.7 (08 May 2021 09:15), Max: 37.2 (07 May 2021 20:00)  T(F): 98.1 (08 May 2021 09:15), Max: 99 (07 May 2021 20:00)  HR: 90 (08 May 2021 09:15) (80 - 92)  BP: 149/88 (08 May 2021 09:15) (107/75 - 151/76)  BP(mean): --  RR: 17 (08 May 2021 09:15) (17 - 18)  SpO2: 98% (08 May 2021 09:15) (95% - 98%)    lungs clear  cor: S1S2  abd: not distended, incisions intact, BS+, no peritoneal signs  ext: without calf tenderness    UO 1250 ml/24 hours charted                          11.2   8.04  )-----------( 160      ( 08 May 2021 08:39 )             35.8   05-08    141  |  109<H>  |  17  ----------------------------<  96  4.1   |  20<L>  |  0.70    Ca    8.6      08 May 2021 08:39  Phos  2.0     05-08  Mg     2.2     05-08

## 2021-05-08 NOTE — DISCHARGE NOTE PROVIDER - NSDCMRMEDTOKEN_GEN_ALL_CORE_FT
acetaminophen 500 mg oral tablet: 2 tab(s) orally every 6 hours, As Needed -for moderate pain MDD:max 4000 mg a day  atorvastatin 10 mg oral tablet: 1 tab(s) orally once a day  Colace 100 mg oral capsule: 1 cap(s) orally once a day, As Needed -for constipation   methIMAzole 5 mg oral tablet: 1 tab(s) orally once a day  oxyCODONE 5 mg oral capsule: 1 cap(s) orally every 8 hours, As Needed -for severe pain MDD:max 3 tabs a day    acetaminophen 500 mg oral tablet: 2 tab(s) orally every 6 hours, As Needed -for moderate pain MDD:max 4000 mg a day  atorvastatin 10 mg oral tablet: 1 tab(s) orally once a day  Colace 100 mg oral capsule: 1 cap(s) orally once a day, As Needed -for constipation   Dilaudid 2 mg oral tablet: 1 tab(s) orally every 8 hours, As Needed -for severe pain MDD:max 2 pills a day   methIMAzole 5 mg oral tablet: 1 tab(s) orally once a day   acetaminophen 500 mg oral tablet: 2 tab(s) orally every 6 hours, As Needed -for moderate pain MDD:max 4000 mg a day  atorvastatin 10 mg oral tablet: 1 tab(s) orally once a day  Colace 100 mg oral capsule: 1 cap(s) orally 2 times a day, As Needed  -for constipation MDD:2  methIMAzole 5 mg oral tablet: 1 tab(s) orally once a day  oxyCODONE 5 mg oral tablet: 1 tab(s) orally every 6 hours, As Needed -for severe pain MDD:4

## 2021-05-08 NOTE — DISCHARGE NOTE PROVIDER - HOSPITAL COURSE
75 year old female with PMH of Graves disease, psoriasis, HLD, HTN, prediabetes, colon polyp presents for extended right hemicolectomy. Patient underwent screening colonoscopy on 12/20 which revealed several polyps and was referred for repeat in a couple of months. Repeat colonoscopy preformed on 3/23/21 which found multiple polyps in the transverse colon as well as a 2.5cm c. Pt is now s/p robot assisted right hemicolectomy on 5/6.  The procedure was uncomplicated and well tolerated by the patient. The post-operative course was uncomplicated. Diet was advanced as tolerated and according to bowel function, and pain was well controlled on medication. On day of discharge, patient had stable vital signs, was tolerating diet, voiding without assistance, and pain was controlled. The patient is to follow up in 2 weeks.

## 2021-05-08 NOTE — DISCHARGE NOTE PROVIDER - NSDCFUADDINST_GEN_ALL_CORE_FT
General Discharge Instructions:  Please resume all regular home medications unless specifically advised not to take a particular medication. Also, please take any new medications as prescribed.  Please get plenty of rest, continue to ambulate several times per day, and drink adequate amounts of fluids. Avoid lifting weights greater than 5-10 lbs until you follow-up with your surgeon, who will instruct you further regarding activity restrictions.  Avoid driving or operating heavy machinery while taking pain medications.  Please follow-up with your surgeon and Primary Care Provider (PCP) as advised.  Incision Care:  *Please call your doctor or nurse practitioner if you have increased pain, swelling, redness, or drainage from the incision site.  *Avoid swimming and baths until your follow-up appointment.  *You may shower, and wash surgical incisions with a mild soap and warm water. Gently pat the area dry.  *If you have staples, they will be removed at your follow-up appointment.  *If you have steri-strips, they will fall off on their own. Please remove any remaining strips 7-10 days after surgery.    Warning Signs:  Please call your doctor or nurse practitioner if you experience the following:  *You experience new chest pain, pressure, squeezing or tightness.  *New or worsening cough, shortness of breath, or wheeze.  *If you are vomiting and cannot keep down fluids or your medications.  *You are getting dehydrated due to continued vomiting, diarrhea, or other reasons. Signs of dehydration include dry mouth, rapid heartbeat, or feeling dizzy or faint when standing.  *You see blood or dark/black material when you vomit or have a bowel movement.  *You experience burning when you urinate, have blood in your urine, or experience a discharge.  *Your pain is not improving within 8-12 hours or is not gone within 24 hours. Call or return immediately if your pain is getting worse, changes location, or moves to your chest or back.  *You have shaking chills, or fever greater than 101.5 degrees Fahrenheit or 38 degrees Celsius.  *Any change in your symptoms, or any new symptoms that concern you.    Please continue a LOW-FIBER DIET. Listed below are some foods you may eat and those you should avoid.   --Allowed foods:  White bread without nuts and seeds. White rice, plain white pasta, and crackers. Refined hot cereals, such as Cream of Wheat, or cold cereals with less than 1 gram of fiber per serving. Pancakes or waffles made from white refined flour. Most canned or well-cooked vegetables and fruits without skins or seeds. Fruit and vegetable juice with little or no pulp, fruit-flavored drinks, and flavored buchanan. Tender meat, poultry, fish, eggs and tofu. Milk and foods made from milk — such as yogurt, pudding, ice cream, cheeses and sour cream — if tolerated. Butter, margarine, oils and salad dressings without seeds.    --Foods to avoid:  Whole-wheat or whole-grain breads, cereals and pasta. Brown or wild rice and other whole grains, such as oats, kasha, barley and quinoa. Dried fruits and prune juice. Raw fruit, including those with seeds, skin or membranes, such as berries.  Raw or undercooked vegetables, including corn.  Dried beans, peas and lentils. Seeds and nuts and foods containing them, including peanut butter and other nut butters. Coconut. Popcorn.

## 2021-05-09 ENCOUNTER — TRANSCRIPTION ENCOUNTER (OUTPATIENT)
Age: 76
End: 2021-05-09

## 2021-05-09 VITALS
TEMPERATURE: 99 F | RESPIRATION RATE: 18 BRPM | DIASTOLIC BLOOD PRESSURE: 83 MMHG | HEART RATE: 87 BPM | OXYGEN SATURATION: 99 % | SYSTOLIC BLOOD PRESSURE: 137 MMHG

## 2021-05-09 LAB
ANION GAP SERPL CALC-SCNC: 10 MMOL/L — SIGNIFICANT CHANGE UP (ref 5–17)
BUN SERPL-MCNC: 17 MG/DL — SIGNIFICANT CHANGE UP (ref 7–23)
CALCIUM SERPL-MCNC: 8.9 MG/DL — SIGNIFICANT CHANGE UP (ref 8.4–10.5)
CHLORIDE SERPL-SCNC: 110 MMOL/L — HIGH (ref 96–108)
CO2 SERPL-SCNC: 23 MMOL/L — SIGNIFICANT CHANGE UP (ref 22–31)
CREAT SERPL-MCNC: 0.7 MG/DL — SIGNIFICANT CHANGE UP (ref 0.5–1.3)
GLUCOSE SERPL-MCNC: 112 MG/DL — HIGH (ref 70–99)
HCT VFR BLD CALC: 35.2 % — SIGNIFICANT CHANGE UP (ref 34.5–45)
HCT VFR BLD CALC: 36.7 % — SIGNIFICANT CHANGE UP (ref 34.5–45)
HGB BLD-MCNC: 11.2 G/DL — LOW (ref 11.5–15.5)
HGB BLD-MCNC: 11.7 G/DL — SIGNIFICANT CHANGE UP (ref 11.5–15.5)
MAGNESIUM SERPL-MCNC: 2.1 MG/DL — SIGNIFICANT CHANGE UP (ref 1.6–2.6)
MCHC RBC-ENTMCNC: 29.6 PG — SIGNIFICANT CHANGE UP (ref 27–34)
MCHC RBC-ENTMCNC: 30 PG — SIGNIFICANT CHANGE UP (ref 27–34)
MCHC RBC-ENTMCNC: 31.8 GM/DL — LOW (ref 32–36)
MCHC RBC-ENTMCNC: 31.9 GM/DL — LOW (ref 32–36)
MCV RBC AUTO: 93.1 FL — SIGNIFICANT CHANGE UP (ref 80–100)
MCV RBC AUTO: 94.1 FL — SIGNIFICANT CHANGE UP (ref 80–100)
NRBC # BLD: 0 /100 WBCS — SIGNIFICANT CHANGE UP (ref 0–0)
NRBC # BLD: 0 /100 WBCS — SIGNIFICANT CHANGE UP (ref 0–0)
PHOSPHATE SERPL-MCNC: 2.8 MG/DL — SIGNIFICANT CHANGE UP (ref 2.5–4.5)
PLATELET # BLD AUTO: 153 K/UL — SIGNIFICANT CHANGE UP (ref 150–400)
PLATELET # BLD AUTO: 166 K/UL — SIGNIFICANT CHANGE UP (ref 150–400)
POTASSIUM SERPL-MCNC: 4.5 MMOL/L — SIGNIFICANT CHANGE UP (ref 3.5–5.3)
POTASSIUM SERPL-SCNC: 4.5 MMOL/L — SIGNIFICANT CHANGE UP (ref 3.5–5.3)
RBC # BLD: 3.78 M/UL — LOW (ref 3.8–5.2)
RBC # BLD: 3.9 M/UL — SIGNIFICANT CHANGE UP (ref 3.8–5.2)
RBC # FLD: 15.1 % — HIGH (ref 10.3–14.5)
RBC # FLD: 15.1 % — HIGH (ref 10.3–14.5)
SODIUM SERPL-SCNC: 143 MMOL/L — SIGNIFICANT CHANGE UP (ref 135–145)
WBC # BLD: 6.45 K/UL — SIGNIFICANT CHANGE UP (ref 3.8–10.5)
WBC # BLD: 6.9 K/UL — SIGNIFICANT CHANGE UP (ref 3.8–10.5)
WBC # FLD AUTO: 6.45 K/UL — SIGNIFICANT CHANGE UP (ref 3.8–10.5)
WBC # FLD AUTO: 6.9 K/UL — SIGNIFICANT CHANGE UP (ref 3.8–10.5)

## 2021-05-09 PROCEDURE — 82962 GLUCOSE BLOOD TEST: CPT

## 2021-05-09 PROCEDURE — 88309 TISSUE EXAM BY PATHOLOGIST: CPT

## 2021-05-09 PROCEDURE — S2900: CPT

## 2021-05-09 PROCEDURE — 86769 SARS-COV-2 COVID-19 ANTIBODY: CPT

## 2021-05-09 PROCEDURE — 83735 ASSAY OF MAGNESIUM: CPT

## 2021-05-09 PROCEDURE — 80048 BASIC METABOLIC PNL TOTAL CA: CPT

## 2021-05-09 PROCEDURE — C1889: CPT

## 2021-05-09 PROCEDURE — 86850 RBC ANTIBODY SCREEN: CPT

## 2021-05-09 PROCEDURE — 86900 BLOOD TYPING SEROLOGIC ABO: CPT

## 2021-05-09 PROCEDURE — 86803 HEPATITIS C AB TEST: CPT

## 2021-05-09 PROCEDURE — 83036 HEMOGLOBIN GLYCOSYLATED A1C: CPT

## 2021-05-09 PROCEDURE — 99231 SBSQ HOSP IP/OBS SF/LOW 25: CPT | Mod: 24

## 2021-05-09 PROCEDURE — 36415 COLL VENOUS BLD VENIPUNCTURE: CPT

## 2021-05-09 PROCEDURE — 84100 ASSAY OF PHOSPHORUS: CPT

## 2021-05-09 PROCEDURE — 85027 COMPLETE CBC AUTOMATED: CPT

## 2021-05-09 PROCEDURE — 86901 BLOOD TYPING SEROLOGIC RH(D): CPT

## 2021-05-09 RX ORDER — OXYCODONE HYDROCHLORIDE 5 MG/1
1 TABLET ORAL
Qty: 5 | Refills: 0
Start: 2021-05-09

## 2021-05-09 RX ORDER — DOCUSATE SODIUM 100 MG
1 CAPSULE ORAL
Qty: 10 | Refills: 0
Start: 2021-05-09 | End: 2021-05-13

## 2021-05-09 RX ADMIN — HEPARIN SODIUM 5000 UNIT(S): 5000 INJECTION INTRAVENOUS; SUBCUTANEOUS at 13:07

## 2021-05-09 RX ADMIN — Medication 15 MILLIGRAM(S): at 00:01

## 2021-05-09 NOTE — PROGRESS NOTE ADULT - SUBJECTIVE AND OBJECTIVE BOX
(Covering for Leno)  POD 3 s/p  right hemicolectomy  Had 5 loose BMs overnight.  Noted very black color and some old blood. Flatus too.  No N/V  Tolerated po diet.    Voiding well.  Wants to go home yet is concerned by multiple loose bm's.  Ambulating.    Vital Signs Last 24 Hrs  T(C): 36.5 (09 May 2021 05:22), Max: 37.3 (08 May 2021 20:13)  T(F): 97.7 (09 May 2021 05:22), Max: 99.1 (08 May 2021 20:13)  HR: 81 (09 May 2021 05:22) (81 - 90)  BP: 138/82 (09 May 2021 05:22) (131/75 - 149/85)  BP(mean): --  RR: 17 (09 May 2021 05:22) (16 - 17)  SpO2: 97% (09 May 2021 05:22) (97% - 98%)    Abd: incisions intact, not distended, BS +  Ext: without calf tenderness    UO: adequate    Labs                        11.2   6.90  )-----------( 153      ( 09 May 2021 12:36 )             35.2   05-09    143  |  110<H>  |  17  ----------------------------<  112<H>  4.5   |  23  |  0.70    Ca    8.9      09 May 2021 06:56  Phos  2.8     05-09  Mg     2.1     05-09

## 2021-05-09 NOTE — PROGRESS NOTE ADULT - ASSESSMENT
75 year old female with PMH of Graves disease, psoriasis, HLD, HTN, prediabetes, colon polyp presents for extended right hemicolectomy. Patient underwent screening colonoscopy on 12/20 which revealed several polyps and was referred for repeat in a couple of months. Repeat colonoscopy preformed on 3/23/21 which found multiple polyps in the transverse colon as well as a 2.5cm c. Pt is now s/p robot assisted right hemicolectomy on 5/6.    Pain/nausea control prn  CLD/IVF  HSQ/SCDs   ISS  Mariza d/c-ed this AM, pending TOV  AM labs   
A/P Doing well overall.  Will advance diet.  If tolerates then d/c later today or in AM  Encourage ambulation  spirometer use as well.
A/P  Doing well overall.  Diarrhea noted and not surprising.  Observe on low residue diet through lunch.  If BM frequency settles down then can d/c home.  Encourage ambulation and spirometer use.   Check hct.  
75 year old female with PMH of Graves disease, psoriasis, HLD, HTN, prediabetes, colon polyp and cecal mass. Pt is now s/p robot assisted right hemicolectomy on 5/6.     Pain/nausea control prn  LFD  HSQ/SCDs   ISS  AM labs       
75 year old female with PMH of Graves disease, psoriasis, HLD, HTN, prediabetes, colon polyp and cecal mass. Pt is now s/p robot assisted right hemicolectomy on 5/6. Advanced to Regular diet, with possible Dc Home soon. HDS    Pain/nausea control prn  LFD  HSQ/SCDs   ISS  AM labs   Pending discharge

## 2021-05-09 NOTE — DISCHARGE NOTE NURSING/CASE MANAGEMENT/SOCIAL WORK - PATIENT PORTAL LINK FT
You can access the FollowMyHealth Patient Portal offered by [facility] by registering at the following website: [email or web address]. By joining "[employer]"’s FollowMyHealth portal, you will also be able to view your health information using other applications (apps) compatible with our system.

## 2021-05-09 NOTE — PROGRESS NOTE ADULT - SUBJECTIVE AND OBJECTIVE BOX
In good spirits this AM  No additional dark BMs  Discussed stable AM labs  Patient seen and examined bedside with chief resident        Vital Signs Last 24 Hrs  T(C): 36.5 (09 May 2021 05:22), Max: 37.3 (08 May 2021 20:13)  T(F): 97.7 (09 May 2021 05:22), Max: 99.1 (08 May 2021 20:13)  HR: 81 (09 May 2021 05:22) (81 - 90)  BP: 138/82 (09 May 2021 05:22) (131/75 - 149/85)  BP(mean): --  RR: 17 (09 May 2021 05:22) (16 - 17)  SpO2: 97% (09 May 2021 05:22) (97% - 98%)    I&O's Detail    08 May 2021 07:01  -  09 May 2021 07:00  --------------------------------------------------------  IN:    Lactated Ringers: 360 mL    Oral Fluid: 680 mL  Total IN: 1040 mL    OUT:    Voided (mL): 2200 mL  Total OUT: 2200 mL    Total NET: -1160 mL      09 May 2021 07:01  -  09 May 2021 11:11  --------------------------------------------------------  IN:    Oral Fluid: 640 mL  Total IN: 640 mL    OUT:  Total OUT: 0 mL    Total NET: 640 mL    Voiding well/regularly, amounts not recorded           PHYSICAL EXAM:  General: NAD  NEURO:  follows commands.   PULM: nonlabored breathing, no respiratory distress  ABD: soft, nondistended, appropriately tender, no rebound, no guarding, no tympany. Incisions CDI and without hematoma or erythema    EXTREM: WWP, no edema, no calf tenderness  VASC: No cyanosis,  no pallor                            11.7   6.45  )-----------( 166      ( 09 May 2021 06:56 )             36.7   05-09    143  |  110<H>  |  17  ----------------------------<  112<H>  4.5   |  23  |  0.70    Ca    8.9      09 May 2021 06:56  Phos  2.8     05-09  Mg     2.1     05-09    H/H stable    No new micro    No new imaging

## 2021-05-10 PROBLEM — C50.919 MALIGNANT NEOPLASM OF UNSPECIFIED SITE OF UNSPECIFIED FEMALE BREAST: Chronic | Status: ACTIVE | Noted: 2021-05-05

## 2021-05-10 PROBLEM — I83.90 ASYMPTOMATIC VARICOSE VEINS OF UNSPECIFIED LOWER EXTREMITY: Chronic | Status: ACTIVE | Noted: 2021-05-05

## 2021-05-10 PROBLEM — R73.03 PREDIABETES: Chronic | Status: ACTIVE | Noted: 2021-05-05

## 2021-05-10 PROBLEM — E78.5 HYPERLIPIDEMIA, UNSPECIFIED: Chronic | Status: ACTIVE | Noted: 2021-05-05

## 2021-05-10 PROBLEM — E05.90 THYROTOXICOSIS, UNSPECIFIED WITHOUT THYROTOXIC CRISIS OR STORM: Chronic | Status: ACTIVE | Noted: 2021-05-05

## 2021-05-10 PROBLEM — E05.00 THYROTOXICOSIS WITH DIFFUSE GOITER WITHOUT THYROTOXIC CRISIS OR STORM: Chronic | Status: ACTIVE | Noted: 2021-05-05

## 2021-05-10 PROBLEM — L71.9 ROSACEA, UNSPECIFIED: Chronic | Status: ACTIVE | Noted: 2021-05-05

## 2021-05-10 PROBLEM — L40.9 PSORIASIS, UNSPECIFIED: Chronic | Status: ACTIVE | Noted: 2021-05-05

## 2021-05-10 PROBLEM — I10 ESSENTIAL (PRIMARY) HYPERTENSION: Chronic | Status: ACTIVE | Noted: 2021-05-05

## 2021-05-10 PROBLEM — E66.01 MORBID (SEVERE) OBESITY DUE TO EXCESS CALORIES: Chronic | Status: ACTIVE | Noted: 2021-05-05

## 2021-05-10 PROBLEM — K63.5 POLYP OF COLON: Chronic | Status: ACTIVE | Noted: 2021-05-05

## 2021-05-13 ENCOUNTER — NON-APPOINTMENT (OUTPATIENT)
Age: 76
End: 2021-05-13

## 2021-05-13 LAB — SURGICAL PATHOLOGY STUDY: SIGNIFICANT CHANGE UP

## 2021-05-17 DIAGNOSIS — I10 ESSENTIAL (PRIMARY) HYPERTENSION: ICD-10-CM

## 2021-05-17 DIAGNOSIS — E78.5 HYPERLIPIDEMIA, UNSPECIFIED: ICD-10-CM

## 2021-05-17 DIAGNOSIS — R73.03 PREDIABETES: ICD-10-CM

## 2021-05-17 DIAGNOSIS — K63.5 POLYP OF COLON: ICD-10-CM

## 2021-05-17 DIAGNOSIS — L40.9 PSORIASIS, UNSPECIFIED: ICD-10-CM

## 2021-05-17 DIAGNOSIS — E66.01 MORBID (SEVERE) OBESITY DUE TO EXCESS CALORIES: ICD-10-CM

## 2021-05-17 DIAGNOSIS — L71.9 ROSACEA, UNSPECIFIED: ICD-10-CM

## 2021-05-17 DIAGNOSIS — E05.00 THYROTOXICOSIS WITH DIFFUSE GOITER WITHOUT THYROTOXIC CRISIS OR STORM: ICD-10-CM

## 2021-05-21 ENCOUNTER — APPOINTMENT (OUTPATIENT)
Dept: COLORECTAL SURGERY | Facility: CLINIC | Age: 76
End: 2021-05-21
Payer: MEDICARE

## 2021-05-21 VITALS
TEMPERATURE: 98.2 F | BODY MASS INDEX: 34.55 KG/M2 | HEIGHT: 60 IN | WEIGHT: 176 LBS | SYSTOLIC BLOOD PRESSURE: 148 MMHG | HEART RATE: 87 BPM | DIASTOLIC BLOOD PRESSURE: 84 MMHG

## 2021-05-21 DIAGNOSIS — K63.5 POLYP OF COLON: ICD-10-CM

## 2021-05-21 PROCEDURE — 99024 POSTOP FOLLOW-UP VISIT: CPT

## 2021-05-21 NOTE — HISTORY OF PRESENT ILLNESS
[FreeTextEntry1] : 76 y/o F English and Wolof speaking F presents for f/u colon polyp\par \par S/p right hemicolectomy 5/6/21\par Recovered well from surgery. Excellent energy. No complaints. Bowel movements regular. Appetite baseline.\par \par \par \par Right colon, hemicolectomy:\par - Sessile serrated polyps (x2).\par - Negative for malignancy.\par - Proximal and distal margins of resection are uninvolved.\par - Negative appendix showing fibroneuromatous obliteration of\par the lumen.\par - 36 negative lymph nodes (0/36).\par

## 2021-05-21 NOTE — ASSESSMENT
[FreeTextEntry1] : The patient has recovered well from surgery. Pathology results reviewed. Sessile serrated adenoma.\par \par Recommend increasing and liberalize her diet. Increasing activity.\par \par I strongly counseled the patient to follow up with her primary gastroenterologist for the need of a surveillance colonoscopy in short [id number] months. The findings of prior colonoscopy were reviewed once again. I strongly counseled the patient regarding the risk of polyps and need for close surveillance in her circumstance given her multiple polyps and its premalignant potential malignant risks in the future.

## 2021-05-21 NOTE — PHYSICAL EXAM
[No HSM] : no hepatosplenomegaly [Normal Breath Sounds] : Normal breath sounds [Abdomen Masses] : No abdominal masses [Abdomen Tenderness] : ~T No ~M abdominal tenderness [JVD] : no jugular venous distention  [de-identified] : Well-healed incisions no hernia or masses.

## 2022-06-23 ENCOUNTER — APPOINTMENT (OUTPATIENT)
Dept: NEUROLOGY | Facility: CLINIC | Age: 77
End: 2022-06-23
Payer: MEDICARE

## 2022-06-23 VITALS
HEIGHT: 60 IN | OXYGEN SATURATION: 99 % | HEART RATE: 74 BPM | WEIGHT: 180 LBS | BODY MASS INDEX: 35.34 KG/M2 | SYSTOLIC BLOOD PRESSURE: 137 MMHG | TEMPERATURE: 97.4 F | DIASTOLIC BLOOD PRESSURE: 87 MMHG

## 2022-06-23 DIAGNOSIS — R26.89 OTHER ABNORMALITIES OF GAIT AND MOBILITY: ICD-10-CM

## 2022-06-23 DIAGNOSIS — R42 DIZZINESS AND GIDDINESS: ICD-10-CM

## 2022-06-23 PROCEDURE — 99214 OFFICE O/P EST MOD 30 MIN: CPT

## 2022-06-23 RX ORDER — MECLIZINE HYDROCHLORIDE 25 MG/1
25 TABLET ORAL
Qty: 180 | Refills: 5 | Status: ACTIVE | COMMUNITY
Start: 2022-06-23 | End: 1900-01-01

## 2022-06-23 NOTE — HISTORY OF PRESENT ILLNESS
[FreeTextEntry1] : \par The patient continues to have balance problems, she denies numbness or tingling. NO weakness or pain in the legs. Has not had any falls. Does exercises at home, previously in gym, has not had balance training. Vertigo is episodic.\par

## 2022-06-23 NOTE — DATA REVIEWED
[de-identified] : WHID [de-identified] : ncs./emg  normal\par Hba1C 6.6\par surgery note appreciated

## 2022-06-23 NOTE — PHYSICAL EXAM
[General Appearance - Alert] : alert [General Appearance - In No Acute Distress] : in no acute distress [Motor Tone] : muscle tone was normal in all four extremities [Motor Strength] : muscle strength was normal in all four extremities [Involuntary Movements] : no involuntary movements were seen [Sensation Tactile Decrease] : light touch was intact [Sensation Pain / Temperature Decrease] : pain and temperature was intact [Abnormal Walk] : normal gait [Balance] : balance was intact

## 2022-06-23 NOTE — ASSESSMENT
[FreeTextEntry1] : Balance problems cw peripheral neuropathy, large fiber, may be due to prior chemotherapy, DM and thyroid d/o (later now well controlled), [place] +[place], normal emg legs, no pain thus medications not useful but will refer to PT for balance training.\par \par Vertigo, episodic, will give Meclizine 25mg Q8hrs prn\par \par I spent the time noted on the day of this patient encounter preparing for, providing and documenting the above E/M service and counseling and educate patient on differential, workup, disease course, and treatment/management. Education was provided to the patient during this encounter. All questions and concerns were answered and addressed in detail. The patient verbalized understanding and agreed to plan. Patient was advised to continue to monitor for neurologic symptoms and to notify my office or go to the nearest emergency room if there are any changes. Any orders/referrals and communications were provided as well. \par Side effects of the above medications were discussed in detail including but not limited to applicable black box warning and teratogenicity as appropriate. \par Patient was advised to bring previous records to my office, including CD of imaging, when applicable. \par \par

## 2022-12-29 ENCOUNTER — APPOINTMENT (OUTPATIENT)
Dept: NEUROLOGY | Facility: CLINIC | Age: 77
End: 2022-12-29

## 2023-11-29 NOTE — HISTORY OF PRESENT ILLNESS
Price (Do Not Change): 0.00 Detail Level: Simple Instructions: This plan will send the code FBSE to the PM system.  DO NOT or CHANGE the price. [FreeTextEntry1] : The patient has h/o breast cancer Dx 2010's received chemo (does not known the name) and radiation, she also has history of borderline DM and thyroid problems for few years and is here for balance problems which started a couple of years ago.  The patient feels unsteady and sways to  the right or the left side when walking.  She denies any numbness or tingling in arms or the legs.  She feels generalized weakness in the hands and legs but no focal weakness.\par \par The patient denies double vision, slurred speech or swallowing problems, dizziness.  She has vertigo on and off for 1-2 years.  She also has ear discomfort/ fullness.  She was given drops without improvement of the symptoms. She denies tinnitus and hearing loss.